# Patient Record
Sex: FEMALE | Race: WHITE | Employment: UNEMPLOYED | URBAN - METROPOLITAN AREA
[De-identification: names, ages, dates, MRNs, and addresses within clinical notes are randomized per-mention and may not be internally consistent; named-entity substitution may affect disease eponyms.]

---

## 2020-11-03 ENCOUNTER — HOSPITAL ENCOUNTER (EMERGENCY)
Age: 25
Discharge: HOME | End: 2020-11-03
Attending: EMERGENCY MEDICINE

## 2020-11-03 VITALS
RESPIRATION RATE: 16 BRPM | BODY MASS INDEX: 29.44 KG/M2 | OXYGEN SATURATION: 98 % | WEIGHT: 160 LBS | TEMPERATURE: 97 F | HEIGHT: 62 IN | SYSTOLIC BLOOD PRESSURE: 124 MMHG | HEART RATE: 89 BPM | DIASTOLIC BLOOD PRESSURE: 79 MMHG

## 2020-11-03 DIAGNOSIS — T74.21XA SEXUAL ASSAULT OF ADULT, INITIAL ENCOUNTER: Primary | ICD-10-CM

## 2020-11-03 LAB — HCG UR QL: NEGATIVE

## 2020-11-03 PROCEDURE — 74011250636 HC RX REV CODE- 250/636: Performed by: EMERGENCY MEDICINE

## 2020-11-03 PROCEDURE — 81003 URINALYSIS AUTO W/O SCOPE: CPT

## 2020-11-03 PROCEDURE — 99284 EMERGENCY DEPT VISIT MOD MDM: CPT

## 2020-11-03 PROCEDURE — 96372 THER/PROPH/DIAG INJ SC/IM: CPT

## 2020-11-03 PROCEDURE — 74011250637 HC RX REV CODE- 250/637: Performed by: EMERGENCY MEDICINE

## 2020-11-03 PROCEDURE — 99285 EMERGENCY DEPT VISIT HI MDM: CPT

## 2020-11-03 PROCEDURE — 87491 CHLMYD TRACH DNA AMP PROBE: CPT

## 2020-11-03 PROCEDURE — 74011000250 HC RX REV CODE- 250: Performed by: EMERGENCY MEDICINE

## 2020-11-03 PROCEDURE — 81025 URINE PREGNANCY TEST: CPT

## 2020-11-03 RX ORDER — IBUPROFEN 600 MG/1
600 TABLET ORAL
Status: COMPLETED | OUTPATIENT
Start: 2020-11-03 | End: 2020-11-03

## 2020-11-03 RX ORDER — HYDROCODONE BITARTRATE AND ACETAMINOPHEN 5; 325 MG/1; MG/1
1 TABLET ORAL ONCE
Status: DISCONTINUED | OUTPATIENT
Start: 2020-11-03 | End: 2020-11-03 | Stop reason: HOSPADM

## 2020-11-03 RX ORDER — LEVONORGESTREL 1.5 MG/1
1.5 TABLET ORAL ONCE
Qty: 1 TAB | Refills: 0 | Status: SHIPPED | OUTPATIENT
Start: 2020-11-03 | End: 2020-11-03

## 2020-11-03 RX ORDER — AZITHROMYCIN 250 MG/1
1000 TABLET, FILM COATED ORAL
Status: COMPLETED | OUTPATIENT
Start: 2020-11-03 | End: 2020-11-03

## 2020-11-03 RX ORDER — LEVONORGESTREL 1.5 MG/1
1.5 TABLET ORAL
Status: COMPLETED | OUTPATIENT
Start: 2020-11-03 | End: 2020-11-03

## 2020-11-03 RX ADMIN — LEVONORGESTREL 1.5 MG: 1.5 TABLET ORAL at 19:08

## 2020-11-03 RX ADMIN — IBUPROFEN 600 MG: 600 TABLET, FILM COATED ORAL at 18:09

## 2020-11-03 RX ADMIN — AZITHROMYCIN MONOHYDRATE 1000 MG: 250 TABLET ORAL at 18:09

## 2020-11-03 RX ADMIN — LIDOCAINE HYDROCHLORIDE 250 MG: 10 INJECTION, SOLUTION INFILTRATION; PERINEURAL at 18:09
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
## 2020-11-04 NOTE — ED NOTES
I have reviewed discharge instructions with the patient. The patient verbalized understanding. Patient left ED via Discharge Method: ambulatory to Home with (self and mother). Opportunity for questions and clarification provided. Patient given 0 scripts. To continue your aftercare when you leave the hospital, you may receive an automated call from our care team to check in on how you are doing. This is a free service and part of our promise to provide the best care and service to meet your aftercare needs.  If you have questions, or wish to unsubscribe from this service please call [phone]. Thank you for Choosing our [facility] Emergency Department.

## 2020-11-06 LAB
C TRACH RRNA SPEC QL NAA+PROBE: NEGATIVE
N GONORRHOEA RRNA SPEC QL NAA+PROBE: POSITIVE
SPECIMEN SOURCE: ABNORMAL

## 2021-01-10 ENCOUNTER — HOSPITAL ENCOUNTER (EMERGENCY)
Age: 26
Discharge: HOME | End: 2021-01-10
Attending: EMERGENCY MEDICINE

## 2021-01-10 VITALS
OXYGEN SATURATION: 98 % | RESPIRATION RATE: 18 BRPM | TEMPERATURE: 99 F | BODY MASS INDEX: 29.44 KG/M2 | HEART RATE: 84 BPM | HEIGHT: 62 IN | SYSTOLIC BLOOD PRESSURE: 120 MMHG | DIASTOLIC BLOOD PRESSURE: 77 MMHG | WEIGHT: 160 LBS

## 2021-01-10 DIAGNOSIS — L73.9 FOLLICULITIS: Primary | ICD-10-CM

## 2021-01-10 LAB — HCG UR QL: NEGATIVE

## 2021-01-10 PROCEDURE — 99284 EMERGENCY DEPT VISIT MOD MDM: CPT

## 2021-01-10 PROCEDURE — 81025 URINE PREGNANCY TEST: CPT

## 2021-01-10 RX ORDER — SULFAMETHOXAZOLE AND TRIMETHOPRIM 800; 160 MG/1; MG/1
1 TABLET ORAL 2 TIMES DAILY
Qty: 14 TAB | Refills: 1 | Status: SHIPPED | OUTPATIENT
Start: 2021-01-10 | End: 2021-07-05 | Stop reason: CLARIF

## 2021-01-11 NOTE — ED PROVIDER NOTES
73-year-old female complains of scattered sores on her back and her legs that are red with small pustules. Slight itch. No fever chills. No vomiting. Had issues like this a number of months ago was on antibiotics but did not finish them. States she has a history of MRSA. Unsure if pregnant. The history is provided by the patient. Skin Infection  This is a new problem. The current episode started more than 1 week ago. The problem occurs constantly. The problem has not changed since onset. Pertinent negatives include no chest pain, no abdominal pain, no headaches and no shortness of breath. Nothing aggravates the symptoms. Nothing relieves the symptoms. She has tried nothing for the symptoms. Past Medical History:   Diagnosis Date    Asthma        No past surgical history on file. No family history on file.     Social History     Socioeconomic History    Marital status: SINGLE     Spouse name: Not on file    Number of children: Not on file    Years of education: Not on file    Highest education level: Not on file   Occupational History    Not on file   Social Needs    Financial resource strain: Not on file    Food insecurity     Worry: Not on file     Inability: Not on file    Transportation needs     Medical: Not on file     Non-medical: Not on file   Tobacco Use    Smoking status: Current Every Day Smoker    Smokeless tobacco: Never Used   Substance and Sexual Activity    Alcohol use: Not Currently    Drug use: Yes     Types: Marijuana    Sexual activity: Not on file   Lifestyle    Physical activity     Days per week: Not on file     Minutes per session: Not on file    Stress: Not on file   Relationships    Social connections     Talks on phone: Not on file     Gets together: Not on file     Attends Tenriism service: Not on file     Active member of club or organization: Not on file     Attends meetings of clubs or organizations: Not on file     Relationship status: Not on file  Intimate partner violence     Fear of current or ex partner: Not on file     Emotionally abused: Not on file     Physically abused: Not on file     Forced sexual activity: Not on file   Other Topics Concern    Not on file   Social History Narrative    Not on file         ALLERGIES: Patient has no known allergies. Review of Systems   Constitutional: Negative for chills and fever. Respiratory: Negative for shortness of breath. Cardiovascular: Negative for chest pain. Gastrointestinal: Negative for abdominal pain. Skin: Positive for rash. Neurological: Negative for headaches. Vitals:    01/10/21 1904 01/10/21 2015 01/10/21 2015   BP: 126/80     Pulse: 91     Resp: 16     Temp: 99 °F (37.2 °C)     SpO2: 96% 99% 96%   Weight: 72.6 kg (160 lb)     Height: 5' 2\" (1.575 m)              Physical Exam  Vitals signs and nursing note reviewed. Constitutional:       Appearance: She is not ill-appearing. Skin:     General: Skin is warm and dry. Comments: Scattered pustules with some surrounding erythema on the back and the thighs. No deep abscesses. Neurological:      Mental Status: She is alert. MDM  Number of Diagnoses or Management Options  Diagnosis management comments: No diagnostics needed.     Risk of Complications, Morbidity, and/or Mortality  Presenting problems: low  Diagnostic procedures: minimal  Management options: low    Patient Progress  Patient progress: stable         Procedures

## 2021-01-11 NOTE — ED TRIAGE NOTES
Pt c/o having MRSA in September [place] and was started on antibiotics. Pt states she was [facility] from [facility]itentiary prior to completing antibiotics. Pt states she has areas on her lower back and left thigh.  Pt masked upon arrival to the ED

## 2021-01-11 NOTE — ED NOTES
I have reviewed discharge instructions with the patient. The patient verbalized understanding. Patient left ED via Discharge Method: ambulatory to Home with self. Opportunity for questions and clarification provided. Patient given 1 scripts. To continue your aftercare when you leave the hospital, you may receive an automated call from our care team to check in on how you are doing. This is a free service and part of our promise to provide the best care and service to meet your aftercare needs.  If you have questions, or wish to unsubscribe from this service please call [phone]. Thank you for Choosing our [facility] Emergency Department.

## 2021-01-11 NOTE — DISCHARGE INSTRUCTIONS
Use Betadine once daily in the shower to cut down on bacterial count. Take antibiotic until complete. Wash hands frequently.

## 2021-02-24 ENCOUNTER — HOSPITAL ENCOUNTER (EMERGENCY)
Age: 26
Discharge: HOME | End: 2021-02-24
Attending: EMERGENCY MEDICINE

## 2021-02-24 VITALS
DIASTOLIC BLOOD PRESSURE: 75 MMHG | RESPIRATION RATE: 16 BRPM | WEIGHT: 160 LBS | TEMPERATURE: 98.1 F | HEART RATE: 104 BPM | BODY MASS INDEX: 29.26 KG/M2 | OXYGEN SATURATION: 96 % | SYSTOLIC BLOOD PRESSURE: 133 MMHG

## 2021-02-24 DIAGNOSIS — L08.9 SKIN INFECTION: Primary | ICD-10-CM

## 2021-02-24 PROCEDURE — 99282 EMERGENCY DEPT VISIT SF MDM: CPT

## 2021-02-24 RX ORDER — CLINDAMYCIN HYDROCHLORIDE 300 MG/1
300 CAPSULE ORAL 4 TIMES DAILY
Qty: 40 CAP | Refills: 0 | Status: SHIPPED | OUTPATIENT
Start: 2021-02-24 | End: 2021-03-06

## 2021-02-24 NOTE — ED PROVIDER NOTES
Patient is here with a red, irritated lesion on her right foot on the dorsal surface. She states that while she was in [place] she was tested for MRSA and it was positive. She had a spot on her back when she got out of [place] and came to the emergency room and was given a prescription for Bactrim and it seemed to do better. She is going to start rehab tomorrow in [place] and her mother is here with her. She states they need her to be on an antibiotic with her history of MRSA due to the lesion on her foot. There is no abscess. She has full range of motion of the foot and there is minimal pain. There is no red streaking up her leg, fever nausea, vomiting, chest pain, shortness of breath, signs or symptoms of sepsis, dizziness or weakness, trouble with urination or bowel movements or other new symptoms. She did ambulate to the room without difficulty and is well-hydrated. She specifically asked for a different antibiotic other than Bactrim today. She states \"the one they were giving me in [place] seem to work and it was a purple capsule. \"    The history is provided by the patient. Skin Problem   This is a new problem. The problem has not changed since onset. The problem is associated with an unknown factor. There has been no fever. The rash is present on the right foot. The pain is at a severity of 0/10. The patient is experiencing no pain. She has tried nothing for the symptoms. Past Medical History:   Diagnosis Date    Asthma        History reviewed. No pertinent surgical history. History reviewed. No pertinent family history.     Social History     Socioeconomic History    Marital status: SINGLE     Spouse name: Not on file    Number of children: Not on file    Years of education: Not on file    Highest education level: Not on file   Occupational History    Not on file   Social Needs    Financial resource strain: Not on file    Food insecurity     Worry: Not on file     Inability: Not on file  Transportation needs     Medical: Not on file     Non-medical: Not on file   Tobacco Use    Smoking status: Current Every Day Smoker    Smokeless tobacco: Never Used   Substance and Sexual Activity    Alcohol use: Not Currently    Drug use: Yes     Types: Marijuana    Sexual activity: Not on file   Lifestyle    Physical activity     Days per week: Not on file     Minutes per session: Not on file    Stress: Not on file   Relationships    Social connections     Talks on phone: Not on file     Gets together: Not on file     Attends Amish service: Not on file     Active member of club or organization: Not on file     Attends meetings of clubs or organizations: Not on file     Relationship status: Not on file    Intimate partner violence     Fear of current or ex partner: Not on file     Emotionally abused: Not on file     Physically abused: Not on file     Forced sexual activity: Not on file   Other Topics Concern    Not on file   Social History Narrative    Not on file         ALLERGIES: Patient has no known allergies. Review of Systems   Constitutional: Negative. HENT: Negative. Eyes: Negative. Respiratory: Negative. Cardiovascular: Negative. Gastrointestinal: Negative. Genitourinary: Negative. Musculoskeletal: Negative. Skin: Positive for color change and wound. Neurological: Negative. Psychiatric/Behavioral: Negative. All other systems reviewed and are negative. Vitals:    02/24/21 1614   BP: 133/75   Pulse: (!) 104   Resp: 16   Temp: 98.1 °F (36.7 °C)   SpO2: 96%   Weight: 72.6 kg (160 lb)            Physical Exam  Vitals signs and nursing note reviewed. Constitutional:       Appearance: She is well-developed. HENT:      Head: Normocephalic and atraumatic.       Right Ear: External ear normal.      Left Ear: External ear normal.      Nose: Nose normal.   Eyes:      Conjunctiva/sclera: Conjunctivae normal.      Pupils: Pupils are equal, round, and reactive to light. Neck:      Musculoskeletal: Normal range of motion and neck supple. Cardiovascular:      Rate and Rhythm: Normal rate and regular rhythm. Heart sounds: Normal heart sounds. Pulmonary:      Effort: Pulmonary effort is normal.      Breath sounds: Normal breath sounds. Abdominal:      General: Bowel sounds are normal.      Palpations: Abdomen is soft. Musculoskeletal: Normal range of motion. Feet:    Skin:     General: Skin is warm and dry. Neurological:      Mental Status: She is alert and oriented to person, place, and time. Deep Tendon Reflexes: Reflexes are normal and symmetric. Psychiatric:         Behavior: Behavior normal.         Thought Content: Thought content normal.         Judgment: Judgment normal.          MDM  Number of Diagnoses or Management Options  Risk of Complications, Morbidity, and/or Mortality  Presenting problems: moderate  Diagnostic procedures: moderate  Management options: moderate    Patient Progress  Patient progress: stable         Procedures      The patient was observed in the ED. I have written for clindamycin with the patient's history of MRSA and her need to enter rehab tomorrow being on an antibiotic. There is no abscess today to drain nor is there any cellulitis that is spreading. Her mother is driving her home. Patient has been outside smoking multiple times and I feel this is the cause of her tachycardia. She is afebrile. She was encouraged to wash the area 2-3 times a day with soap and water, blot dry and apply Neosporin. She is ambulatory out of the ER without difficulty at this time. I discussed the results of all labs, procedures, radiographs, and treatments with the patient and available family. Treatment plan is agreed upon and the patient is ready for discharge. All voiced understanding of the discharge plan and medication instructions or changes as appropriate. Questions about treatment in the ED were answered. All were encouraged to return should symptoms worsen or new problems develop.

## 2021-02-24 NOTE — ED NOTES
I have reviewed discharge instructions with the patient. The patient verbalized understanding. Patient left ED via Discharge Method: ambulatory to Home with self. Opportunity for questions and clarification provided. Patient given 1 scripts. To continue your aftercare when you leave the hospital, you may receive an automated call from our care team to check in on how you are doing. This is a free service and part of our promise to provide the best care and service to meet your aftercare needs.  If you have questions, or wish to unsubscribe from this service please call [phone]. Thank you for Choosing our [facility] Emergency Department.

## 2021-07-05 ENCOUNTER — HOSPITAL ENCOUNTER (EMERGENCY)
Age: 26
Discharge: HOME | End: 2021-07-05
Attending: STUDENT IN AN ORGANIZED HEALTH CARE EDUCATION/TRAINING PROGRAM

## 2021-07-05 VITALS
OXYGEN SATURATION: 97 % | BODY MASS INDEX: 28.52 KG/M2 | HEART RATE: 89 BPM | HEIGHT: 62 IN | RESPIRATION RATE: 20 BRPM | DIASTOLIC BLOOD PRESSURE: 79 MMHG | TEMPERATURE: 98.1 F | WEIGHT: 155 LBS | SYSTOLIC BLOOD PRESSURE: 117 MMHG

## 2021-07-05 DIAGNOSIS — L08.9 INFECTION OF SKIN DUE TO METHICILLIN RESISTANT STAPHYLOCOCCUS AUREUS (MRSA): Primary | ICD-10-CM

## 2021-07-05 DIAGNOSIS — B95.62 INFECTION OF SKIN DUE TO METHICILLIN RESISTANT STAPHYLOCOCCUS AUREUS (MRSA): Primary | ICD-10-CM

## 2021-07-05 PROCEDURE — 99282 EMERGENCY DEPT VISIT SF MDM: CPT

## 2021-07-05 RX ORDER — CLINDAMYCIN HYDROCHLORIDE 150 MG/1
300 CAPSULE ORAL 3 TIMES DAILY
Qty: 42 CAPSULE | Refills: 0 | Status: SHIPPED | OUTPATIENT
Start: 2021-07-05 | End: 2021-07-12

## 2021-07-05 NOTE — ED NOTES
I have reviewed discharge instructions with the patient. The patient verbalized understanding. Patient left ED via Discharge Method: ambulatory to Home. Opportunity for questions and clarification provided. Patient given 1 scripts. Clindamycin. F/u with PCP provided. To continue your aftercare when you leave the hospital, you may receive an automated call from our care team to check in on how you are doing. This is a free service and part of our promise to provide the best care and service to meet your aftercare needs.  If you have questions, or wish to unsubscribe from this service please call [phone]. Thank you for Choosing our [facility] Emergency Department.

## 2021-07-05 NOTE — ED PROVIDER NOTES
Pt with recurrent skin lesions to left index finger and lef great toe, h/o mrsa, no fever,     The history is provided by the patient. Wound Check   This is a recurrent problem. The current episode started more than 2 days ago. The problem occurs rarely. The problem has been gradually worsening. Pain location: left index finger and left great toe. The quality of the pain is described as aching. The pain is at a severity of 5/10. Pertinent negatives include no numbness. The symptoms are aggravated by activity and palpation. She has tried nothing for the symptoms. The treatment provided no relief. There has been no history of extremity trauma. Past Medical History:   Diagnosis Date    Asthma        History reviewed. No pertinent surgical history. History reviewed. No pertinent family history. Social History     Socioeconomic History    Marital status: SINGLE     Spouse name: Not on file    Number of children: Not on file    Years of education: Not on file    Highest education level: Not on file   Occupational History    Not on file   Tobacco Use    Smoking status: Current Every Day Smoker    Smokeless tobacco: Never Used   Substance and Sexual Activity    Alcohol use: Not Currently    Drug use: Yes     Types: Marijuana    Sexual activity: Not on file   Other Topics Concern    Not on file   Social History Narrative    Not on file     Social Determinants of Health     Financial Resource Strain:     Difficulty of Paying Living Expenses:    Food Insecurity:     Worried About Running Out of Food in the Last Year:     [address] in the Last Year:    Transportation Needs:     Lack of Transportation (Medical):      Lack of Transportation (Non-Medical):    Physical Activity:     Days of Exercise per Week:     Minutes of Exercise per Session:    Stress:     Feeling of Stress :    Social Connections:     Frequency of Communication with Friends and Family:     Frequency of Social Gatherings with Friends and Family:     Attends Manda[employer] Services:     Active Member of Clubs or Organizations:     Attends Club or Organization Meetings:     Marital Status:    Intimate Partner Violence:     Fear of Current or Ex-Partner:     Emotionally Abused:     Physically Abused:     Sexually Abused: ALLERGIES: Patient has no known allergies. Review of Systems   Neurological: Negative for numbness. All other systems reviewed and are negative. Vitals:    07/05/21 1439   BP: 117/79   Pulse: 89   Resp: 20   Temp: 98.1 °F (36.7 °C)   SpO2: 97%   Weight: 70.3 kg (155 lb)   Height: 5' 2\" (1.575 m)            Physical Exam  Vitals and nursing note reviewed. Constitutional:       General: She is not in acute distress. Appearance: Normal appearance. She is well-developed. She is not diaphoretic. HENT:      Head: Normocephalic and atraumatic. Eyes:      Pupils: Pupils are equal, round, and reactive to light. Cardiovascular:      Rate and Rhythm: Normal rate and regular rhythm. Pulmonary:      Effort: Pulmonary effort is normal.      Breath sounds: Normal breath sounds. Abdominal:      General: Bowel sounds are normal.      Palpations: Abdomen is soft. Musculoskeletal:         General: Normal range of motion. Cervical back: Normal range of motion and neck supple. Skin:     General: Skin is warm. Findings: Rash present. Comments: Left index finger with redness to distal dorsal area, burst blister noted, no swelling to finger, no other lesions to hand, left great toe with similar lesion   Neurological:      General: No focal deficit present. Mental Status: She is alert and oriented to person, place, and time.    Psychiatric:         Mood and Affect: Mood normal.         Behavior: Behavior normal.          MDM  Number of Diagnoses or Management Options  Diagnosis management comments: mrsa to skin , will treat with cleocin, keep clean        Amount and/or Complexity of Data Reviewed  Review and summarize past medical records: yes    Risk of Complications, Morbidity, and/or Mortality  Presenting problems: low  Diagnostic procedures: low    Patient Progress  Patient progress: improved         Procedures

## 2021-07-05 NOTE — ED TRIAGE NOTES
Pt states she has been dx with MRSA and states she has a wound on left index finger and one on each great toe that are blisters. She said a friend was dx with cellulitis and her wounds look like his did. Masked for triage.

## 2024-04-27 ENCOUNTER — HOSPITAL ENCOUNTER (EMERGENCY)
Age: 29
Discharge: HOME | End: 2024-04-27
Attending: GENERAL PRACTICE

## 2024-04-27 ENCOUNTER — APPOINTMENT (OUTPATIENT)
Dept: GENERAL RADIOLOGY | Age: 29
End: 2024-04-27

## 2024-04-27 VITALS
BODY MASS INDEX: 40.59 KG/M2 | HEART RATE: 57 BPM | SYSTOLIC BLOOD PRESSURE: 121 MMHG | HEIGHT: 61 IN | RESPIRATION RATE: 18 BRPM | OXYGEN SATURATION: 96 % | TEMPERATURE: 97.7 F | WEIGHT: 215 LBS | DIASTOLIC BLOOD PRESSURE: 69 MMHG

## 2024-04-27 DIAGNOSIS — J20.9 BRONCHOSPASM WITH BRONCHITIS, ACUTE: Primary | ICD-10-CM

## 2024-04-27 LAB
ALBUMIN SERPL-MCNC: 3.6 G/DL (ref 3.5–5)
ALBUMIN/GLOB SERPL: 1.1 (ref 1–1.9)
ALP SERPL-CCNC: 80 U/L (ref 35–104)
ALT SERPL-CCNC: 18 U/L (ref 12–65)
ANION GAP SERPL CALC-SCNC: 13 MMOL/L (ref 9–18)
AST SERPL-CCNC: 36 U/L (ref 15–37)
BACTERIA URNS QL MICRO: ABNORMAL /HPF
BASOPHILS # BLD: 0.1 K/UL (ref 0–0.2)
BASOPHILS NFR BLD: 1 % (ref 0–2)
BILIRUB SERPL-MCNC: 0.3 MG/DL (ref 0–1.2)
BILIRUB UR QL: ABNORMAL
BUN SERPL-MCNC: 13 MG/DL (ref 6–23)
CALCIUM SERPL-MCNC: 8.9 MG/DL (ref 8.8–10.2)
CASTS URNS QL MICRO: ABNORMAL /LPF
CHLORIDE SERPL-SCNC: 105 MMOL/L (ref 98–107)
CO2 SERPL-SCNC: 20 MMOL/L (ref 20–28)
CREAT SERPL-MCNC: 0.73 MG/DL (ref 0.6–1.1)
DIFFERENTIAL METHOD BLD: ABNORMAL
EOSINOPHIL # BLD: 0.6 K/UL (ref 0–0.8)
EOSINOPHIL NFR BLD: 5 % (ref 0.5–7.8)
EPI CELLS #/AREA URNS HPF: ABNORMAL /HPF
ERYTHROCYTE [DISTWIDTH] IN BLOOD BY AUTOMATED COUNT: 12.2 % (ref 11.9–14.6)
FLUAV RNA SPEC QL NAA+PROBE: NOT DETECTED
FLUBV RNA SPEC QL NAA+PROBE: NOT DETECTED
GLOBULIN SER CALC-MCNC: 3.4 G/DL (ref 2.3–3.5)
GLUCOSE SERPL-MCNC: 108 MG/DL (ref 70–99)
GLUCOSE UR QL STRIP.AUTO: NEGATIVE MG/DL
HCG UR QL: NEGATIVE
HCT VFR BLD AUTO: 43.6 % (ref 35.8–46.3)
HGB BLD-MCNC: 15 G/DL (ref 11.7–15.4)
IMM GRANULOCYTES # BLD AUTO: 0 K/UL (ref 0–0.5)
IMM GRANULOCYTES NFR BLD AUTO: 0 % (ref 0–5)
KETONES UR-MCNC: NEGATIVE MG/DL
LEUKOCYTE ESTERASE UR QL STRIP: NEGATIVE
LIPASE SERPL-CCNC: 18 U/L (ref 13–60)
LYMPHOCYTES # BLD: 1.9 K/UL (ref 0.5–4.6)
LYMPHOCYTES NFR BLD: 16 % (ref 13–44)
MCH RBC QN AUTO: 32.7 PG (ref 26.1–32.9)
MCHC RBC AUTO-ENTMCNC: 34.4 G/DL (ref 31.4–35)
MCV RBC AUTO: 95 FL (ref 82–102)
MONOCYTES # BLD: 0.6 K/UL (ref 0.1–1.3)
MONOCYTES NFR BLD: 5 % (ref 4–12)
NEUTS SEG # BLD: 8.5 K/UL (ref 1.7–8.2)
NEUTS SEG NFR BLD: 72 % (ref 43–78)
NITRITE UR QL: NEGATIVE
NRBC # BLD: 0 K/UL (ref 0–0.2)
PH UR: 6.5 (ref 5–9)
PLATELET # BLD AUTO: 223 K/UL (ref 150–450)
PMV BLD AUTO: 13.1 FL (ref 9.4–12.3)
POTASSIUM SERPL-SCNC: 4.2 MMOL/L (ref 3.5–5.1)
PROT SERPL-MCNC: 7 G/DL (ref 6.3–8.2)
PROT UR QL: 30 MG/DL
RBC # BLD AUTO: 4.59 M/UL (ref 4.05–5.2)
RBC # UR STRIP: ABNORMAL
RBC #/AREA URNS HPF: ABNORMAL /HPF
SARS-COV-2 RDRP RESP QL NAA+PROBE: NOT DETECTED
SERVICE CMNT-IMP: ABNORMAL
SODIUM SERPL-SCNC: 138 MMOL/L (ref 136–145)
SOURCE: NORMAL
SP GR UR: >1.03 (ref 1–1.02)
UROBILINOGEN UR QL: 0.2 EU/DL (ref 0.2–1)
WBC # BLD AUTO: 11.7 K/UL (ref 4.3–11.1)
WBC URNS QL MICRO: ABNORMAL /HPF

## 2024-04-27 PROCEDURE — 81015 MICROSCOPIC EXAM OF URINE: CPT

## 2024-04-27 PROCEDURE — 71046 X-RAY EXAM CHEST 2 VIEWS: CPT

## 2024-04-27 PROCEDURE — 94640 AIRWAY INHALATION TREATMENT: CPT

## 2024-04-27 PROCEDURE — 87635 SARS-COV-2 COVID-19 AMP PRB: CPT

## 2024-04-27 PROCEDURE — 81003 URINALYSIS AUTO W/O SCOPE: CPT

## 2024-04-27 PROCEDURE — 81025 URINE PREGNANCY TEST: CPT

## 2024-04-27 PROCEDURE — 6370000000 HC RX 637 (ALT 250 FOR IP): Performed by: GENERAL PRACTICE

## 2024-04-27 PROCEDURE — 87502 INFLUENZA DNA AMP PROBE: CPT

## 2024-04-27 PROCEDURE — 80053 COMPREHEN METABOLIC PANEL: CPT

## 2024-04-27 PROCEDURE — 83690 ASSAY OF LIPASE: CPT

## 2024-04-27 PROCEDURE — 85025 COMPLETE CBC W/AUTO DIFF WBC: CPT

## 2024-04-27 PROCEDURE — 99284 EMERGENCY DEPT VISIT MOD MDM: CPT

## 2024-04-27 RX ORDER — IPRATROPIUM BROMIDE AND ALBUTEROL SULFATE 2.5; .5 MG/3ML; MG/3ML
1 SOLUTION RESPIRATORY (INHALATION)
Status: COMPLETED | OUTPATIENT
Start: 2024-04-27 | End: 2024-04-27

## 2024-04-27 RX ORDER — PREDNISONE 10 MG/1
40 TABLET ORAL ONCE
Status: COMPLETED | OUTPATIENT
Start: 2024-04-27 | End: 2024-04-27

## 2024-04-27 RX ORDER — PREDNISONE 20 MG/1
20 TABLET ORAL 2 TIMES DAILY
Qty: 10 TABLET | Refills: 0 | Status: SHIPPED | OUTPATIENT
Start: 2024-04-27 | End: 2024-05-02

## 2024-04-27 RX ORDER — ALBUTEROL SULFATE 90 UG/1
2 AEROSOL, METERED RESPIRATORY (INHALATION) EVERY 6 HOURS PRN
Qty: 18 G | Refills: 3 | Status: SHIPPED | OUTPATIENT
Start: 2024-04-27

## 2024-04-27 RX ORDER — BENZONATATE 100 MG/1
100 CAPSULE ORAL 2 TIMES DAILY PRN
Qty: 20 CAPSULE | Refills: 0 | Status: SHIPPED | OUTPATIENT
Start: 2024-04-27 | End: 2024-05-04

## 2024-04-27 RX ADMIN — PREDNISONE 40 MG: 10 TABLET ORAL at 08:43

## 2024-04-27 RX ADMIN — IPRATROPIUM BROMIDE AND ALBUTEROL SULFATE 1 DOSE: .5; 3 SOLUTION RESPIRATORY (INHALATION) at 08:44

## 2024-04-27 ASSESSMENT — LIFESTYLE VARIABLES
HOW OFTEN DO YOU HAVE A DRINK CONTAINING ALCOHOL: NEVER
HOW MANY STANDARD DRINKS CONTAINING ALCOHOL DO YOU HAVE ON A TYPICAL DAY: PATIENT DOES NOT DRINK

## 2024-04-27 NOTE — ED NOTES
Patient mobility status  with no difficulty. Provider aware     I have reviewed discharge instructions with the patient.  The patient verbalized understanding.    Patient left ED via Discharge Method: ambulatory to Home with  family .    Opportunity for questions and clarification provided.     Patient given 3 scripts.           Mai Gillette LPN  04/27/24 1143

## 2024-04-27 NOTE — ED PROVIDER NOTES
Emergency Department Provider Note       PCP: No primary care provider on file.   Age: 29 y.o.   Sex: female     DISPOSITION Decision To Discharge 04/27/2024 10:59:52 AM       ICD-10-CM    1. Bronchospasm with bronchitis, acute  J20.9           Medical Decision Making     Patient presents with cough and wheeze in the setting of smoking and likely having an acute bronchitis.  Patient feels better after treatments here.  She will be sent home with albuterol inhaler and a course of steroids and cough medicine.  I suspect viral etiology causing bronchospasm likely associated with her smoking.  There is nothing to suggest congestive heart failure, pulmonary embolism, pneumonia, ACS, CHF, or any other emergent pathology.  Patient will be treated symptomatically and expectantly with close follow-up and return precautions.     1 acute illness with systemic symptoms.  Prescription drug management performed.  Shared medical decision making was utilized in creating the patients health plan today.  Diagnosis or care significantly limited by social determinants of health smoking.    I independently ordered and reviewed each unique test.  I reviewed external records: ED visit note from an outside group.  I reviewed external records: previous lab results from outside ED.  I reviewed external records: previous imaging study including radiologist interpretation.     I interpreted the X-rays chest x-ray shows no evidence of infiltrate or edema and heart size is normal.  I have reviewed and agree with radiology report.        Exclusion criteria - the patient is NOT to be included for SEP-1 Core Measure due to: Viral etiology found or highly suspected (including COVID-19) without concomitant bacterial infection       History     Patient presents with cough and congestion.  She also does have some sputum and intermittent wheezing.  Patient does smoke.  No obvious fevers.  She did have multiple bouts of nausea vomiting and diarrhea  Hematocrit 43.6 35.8 - 46.3 %    MCV 95.0 82 - 102 FL    MCH 32.7 26.1 - 32.9 PG    MCHC 34.4 31.4 - 35.0 g/dL    RDW 12.2 11.9 - 14.6 %    Platelets 223 150 - 450 K/uL    MPV 13.1 (H) 9.4 - 12.3 FL    nRBC 0.00 0.0 - 0.2 K/uL    Differential Type AUTOMATED      Neutrophils % 72 43 - 78 %    Lymphocytes % 16 13 - 44 %    Monocytes % 5 4.0 - 12.0 %    Eosinophils % 5 0.5 - 7.8 %    Basophils % 1 0.0 - 2.0 %    Immature Granulocytes % 0 0.0 - 5.0 %    Neutrophils Absolute 8.5 (H) 1.7 - 8.2 K/UL    Lymphocytes Absolute 1.9 0.5 - 4.6 K/UL    Monocytes Absolute 0.6 0.1 - 1.3 K/UL    Eosinophils Absolute 0.6 0.0 - 0.8 K/UL    Basophils Absolute 0.1 0.0 - 0.2 K/UL    Immature Granulocytes Absolute 0.0 0.0 - 0.5 K/UL   Comprehensive Metabolic Panel   Result Value Ref Range    Sodium 138 136 - 145 mmol/L    Potassium 4.2 3.5 - 5.1 mmol/L    Chloride 105 98 - 107 mmol/L    CO2 20 20 - 28 mmol/L    Anion Gap 13 9 - 18 mmol/L    Glucose 108 (H) 70 - 99 mg/dL    BUN 13 6 - 23 MG/DL    Creatinine 0.73 0.60 - 1.10 MG/DL    Est, Glom Filt Rate >90 >60 ml/min/1.73m2    Calcium 8.9 8.8 - 10.2 MG/DL    Total Bilirubin 0.3 0.0 - 1.2 MG/DL    ALT 18 12 - 65 U/L    AST 36 15 - 37 U/L    Alk Phosphatase 80 35 - 104 U/L    Total Protein 7.0 6.3 - 8.2 g/dL    Albumin 3.6 3.5 - 5.0 g/dL    Globulin 3.4 2.3 - 3.5 g/dL    Albumin/Globulin Ratio 1.1 1.0 - 1.9     Lipase   Result Value Ref Range    Lipase 18 13 - 60 U/L   Urinalysis, Micro   Result Value Ref Range    WBC, UA 0-4 U4 /hpf    RBC, UA 0-5 U5 /hpf    Epithelial Cells UA 5-10 (A) U5 /hpf    BACTERIA, URINE 1+ (A) NEG /hpf    Casts 0-2 U2 /lpf   POCT Urinalysis no Micro   Result Value Ref Range    Specific Gravity, Urine, POC >1.030 (H) 1.001 - 1.023    pH, Urine, POC 6.5 5.0 - 9.0      Protein, Urine, POC 30 (A) NEG mg/dL    Glucose, UA POC Negative NEG mg/dL    Ketones, Urine, POC Negative NEG mg/dL    Bilirubin, Urine, POC SMALL (A) NEG      Blood, UA POC Trace Intact (A) NEG

## 2024-04-27 NOTE — DISCHARGE INSTR - COC
Continuity of Care Form    Patient Name: Abdulaziz Pompa   :  1995  MRN:  [id number]    Admit date:  2024  Discharge date:  ***    Code Status Order: No Order   Advance Directives:     Admitting Physician:  No admitting provider for patient encounter.  PCP: No primary care provider on file.    Discharging Nurse: ***  Discharging Hospital Unit/Room#: D06/D06  Discharging Unit Phone Number: ***    Emergency Contact:   Extended Emergency Contact Information  Primary Emergency Contact: Analia Enamorado  Home Phone: [phone]  Mobile Phone: [phone]  Relation: Parent    Past Surgical History:  No past surgical history on file.    Immunization History:     There is no immunization history on file for this patient.    Active Problems:  There is no problem list on file for this patient.      Isolation/Infection:   Isolation            No Isolation          Patient Infection Status       None to display                     Nurse Assessment:  Last Vital Signs: BP (!) 132/91   Pulse 62   Temp 97.7 °F (36.5 °C) (Oral)   Resp 18   Ht 1.549 m (5' 1\")   Wt 97.5 kg (215 lb)   SpO2 97%   BMI 40.62 kg/m²     Last documented pain score (0-10 scale):    Last Weight:   Wt Readings from Last 1 Encounters:   24 97.5 kg (215 lb)     Mental Status:  {IP PT MENTAL STATUS:}    IV Access:  { NAZANIN IV ACCESS:712000921}    Nursing Mobility/ADLs:  Walking   {CHP DME ADLs:530437561}  Transfer  {CHP DME ADLs:071637932}  Bathing  {CHP DME ADLs:292207239}  Dressing  {CHP DME ADLs:340767474}  Toileting  {CHP DME ADLs:446924900}  Feeding  {CHP DME ADLs:823947648}  Med Admin  {CHP DME ADLs:067511755}  Med Delivery   { NAZANIN MED Delivery:615069234}    Wound Care Documentation and Therapy:        Elimination:  Continence:   Bowel: {YES / NO:}  Bladder: {YES / NO:}  Urinary Catheter: {Urinary Catheter:744923042}   Colostomy/Ileostomy/Ileal Conduit: {YES / NO:}       Date of Last BM: ***  No intake or output  data in the 24 hours ending 24 1133  No intake/output data recorded.    Safety Concerns:     { NAZANIN Safety Concerns:705346527}    Impairments/Disabilities:      { NAZANIN Impairments/Disabilities:255272312}    Nutrition Therapy:  Current Nutrition Therapy:   { NAZANIN Diet List:795221612}    Routes of Feeding: {CHP DME Other Feedings:409904549}  Liquids: {Slp liquid thickness:63032}  Daily Fluid Restriction: {CHP DME Yes amt example:896111709}  Last Modified Barium Swallow with Video (Video Swallowing Test): {Done Not Done Date:}    Treatments at the Time of Hospital Discharge:   Respiratory Treatments: ***  Oxygen Therapy:  {Therapy; copd oxygen:39772}  Ventilator:    { CC Vent List:116451453}    Rehab Therapies: {THERAPEUTIC INTERVENTION:1384533130}  Weight Bearing Status/Restrictions: { CC Weight Bearin}  Other Medical Equipment (for information only, NOT a DME order):  {EQUIPMENT:112903575}  Other Treatments: ***    Patient's personal belongings (please select all that are sent with patient):  {[facility] DME Belongings:990859333}    RN SIGNATURE:  {Esignature:824973389}    CASE MANAGEMENT/SOCIAL WORK SECTION    Inpatient Status Date: ***    Readmission Risk Assessment Score:  Readmission Risk              Risk of Unplanned Readmission:  0           Discharging to Facility/ Agency   Name:   Address:  Phone:  Fax:    Dialysis Facility (if applicable)   Name:  Address:  Dialysis Schedule:  Phone:  Fax:    / signature: {Esignature:342833860}    PHYSICIAN SECTION    Prognosis: {Prognosis:9825724426}    Condition at Discharge: { Patient Condition:461495663}    Rehab Potential (if transferring to Rehab): {Prognosis:6230073627}    Recommended Labs or Other Treatments After Discharge: ***    Physician Certification: I certify the above information and transfer of Abdulaziz Pompa  is necessary for the continuing treatment of the diagnosis listed and that she requires {Admit to

## 2024-04-27 NOTE — ED TRIAGE NOTES
Per patient productive cough with green sputum x4 days. States yesterday multiple bouts of n/v/d. 0 bouts of n/v/d today. Denies abdominal pain. Denies gu complications. States chills yesterday that resolved after medication. Patient states hx of asthma feels intermittent wheezing.

## 2024-12-08 ENCOUNTER — HOSPITAL ENCOUNTER (EMERGENCY)
Age: 29
Discharge: HOME | End: 2024-12-08
Attending: EMERGENCY MEDICINE

## 2024-12-08 VITALS
BODY MASS INDEX: 30.23 KG/M2 | WEIGHT: 160 LBS | TEMPERATURE: 98 F | RESPIRATION RATE: 19 BRPM | SYSTOLIC BLOOD PRESSURE: 136 MMHG | HEART RATE: 96 BPM | OXYGEN SATURATION: 98 % | DIASTOLIC BLOOD PRESSURE: 87 MMHG

## 2024-12-08 DIAGNOSIS — L01.00 IMPETIGO: Primary | ICD-10-CM

## 2024-12-08 PROCEDURE — 99283 EMERGENCY DEPT VISIT LOW MDM: CPT

## 2024-12-08 RX ORDER — SULFAMETHOXAZOLE AND TRIMETHOPRIM 800; 160 MG/1; MG/1
1 TABLET ORAL 2 TIMES DAILY
Qty: 14 TABLET | Refills: 0 | Status: SHIPPED | OUTPATIENT
Start: 2024-12-08 | End: 2024-12-08

## 2024-12-08 RX ORDER — MUPIROCIN 20 MG/G
OINTMENT TOPICAL
Qty: 30 G | Refills: 0 | Status: SHIPPED | OUTPATIENT
Start: 2024-12-08

## 2024-12-08 RX ORDER — SULFAMETHOXAZOLE AND TRIMETHOPRIM 800; 160 MG/1; MG/1
1 TABLET ORAL 2 TIMES DAILY
Qty: 14 TABLET | Refills: 0 | Status: SHIPPED | OUTPATIENT
Start: 2024-12-08 | End: 2024-12-15

## 2024-12-08 RX ORDER — MUPIROCIN 20 MG/G
OINTMENT TOPICAL
Qty: 30 G | Refills: 0 | Status: SHIPPED | OUTPATIENT
Start: 2024-12-08 | End: 2024-12-08

## 2024-12-08 ASSESSMENT — PAIN - FUNCTIONAL ASSESSMENT: PAIN_FUNCTIONAL_ASSESSMENT: NONE - DENIES PAIN

## 2024-12-08 ASSESSMENT — LIFESTYLE VARIABLES
HOW MANY STANDARD DRINKS CONTAINING ALCOHOL DO YOU HAVE ON A TYPICAL DAY: PATIENT DOES NOT DRINK
HOW OFTEN DO YOU HAVE A DRINK CONTAINING ALCOHOL: NEVER

## 2024-12-09 NOTE — ED PROVIDER NOTES
Emergency Department Provider Note       PCP: No primary care provider on file.   Age: 29 y.o.   Sex: female     DISPOSITION Decision To Discharge 12/08/2024 10:10:54 PM   DISPOSITION CONDITION Stable            ICD-10-CM    1. Impetigo  L01.00           Medical Decision Making     Ddx:    Staphylococcal, impetigo, MRSA,     1 acute, uncomplicated illness or injury.  Prescription drug management performed.    I independently ordered and reviewed each unique test.                     History     Patient is a 29-year-old female presenting to the emergency department today complaining of a rash which started initially on the chest on the top of the tattoo she got approximately 1 month ago.  Patient has been trying to treated at home with peroxide and antibacterial spray.  Patient said she got concerned when she noticed a spot on her left lower leg which started 2 days ago.  Patient last menstrual cycle was 2 weeks ago.          ROS     Review of Systems   Skin:  Positive for rash.        Physical Exam     Vitals signs and nursing note reviewed:  Vitals:    12/08/24 2134   BP: 136/87   Pulse: 96   Resp: 19   Temp: 98 °F (36.7 °C)   TempSrc: Oral   SpO2: 98%   Weight: 72.6 kg (160 lb)      Physical Exam     GENERAL:The patient has Body mass index is 30.23 kg/m². Well-hydrated.  No acute distress.  VITAL SIGNS: Heart rate, blood pressure, respiratory rate reviewed as recorded in  nurse's notes  EYES: Pupils reactive. Extraocular motion intact. No conjunctival redness or drainage.  LUNGS: No accessory muscle use  CARDIOVASCULAR: Regular rate and rhythm  EXTREMITIES: Pt moving all 4 extremities with out limitations. Normal muscle tone.  NEUROLOGIC: Cranial nerve exam reveals face is symmetrical, tongue is midline  speech is clear. No focal deficits noted  SKIN: No petechiae. Good skin turgor palpated.  Honey crusted erythematous rash on the right upper chest to the left lower leg as shown in the photo below.  This is

## 2024-12-09 NOTE — DISCHARGE INSTRUCTIONS
Take full course of antibiotic as prescribed.    It is important to take probiotics while taking antibiotics to help your normal gut brielle.  Use the Bactroban over the affected area 3 times a day.  Cut your nails short and keep them clean until the rash completely resolves.

## 2024-12-09 NOTE — ED NOTES
Patient mobility status  with no difficulty.     I have reviewed discharge instructions with the patient.  The patient verbalized understanding.    Patient left ED via Discharge Method: ambulatory to Home with  significant other .    Opportunity for questions and clarification provided.     Patient given 2 scripts.            Iban, Sharmaine, RN  12/08/24 1679